# Patient Record
Sex: FEMALE | Race: WHITE | Employment: UNEMPLOYED | URBAN - METROPOLITAN AREA
[De-identification: names, ages, dates, MRNs, and addresses within clinical notes are randomized per-mention and may not be internally consistent; named-entity substitution may affect disease eponyms.]

---

## 2023-07-29 ENCOUNTER — HOSPITAL ENCOUNTER (OUTPATIENT)
Age: 68
Discharge: HOME | End: 2023-07-29
Attending: EMERGENCY MEDICINE
Payer: MEDICARE

## 2023-07-29 ENCOUNTER — APPOINTMENT (OUTPATIENT)
Dept: GENERAL RADIOLOGY | Age: 68
End: 2023-07-29
Attending: EMERGENCY MEDICINE
Payer: MEDICARE

## 2023-07-29 VITALS
SYSTOLIC BLOOD PRESSURE: 94 MMHG | HEART RATE: 58 BPM | OXYGEN SATURATION: 99 % | RESPIRATION RATE: 20 BRPM | TEMPERATURE: 97 F | DIASTOLIC BLOOD PRESSURE: 55 MMHG

## 2023-07-29 DIAGNOSIS — M19.039 WRIST ARTHRITIS: Primary | ICD-10-CM

## 2023-07-29 PROCEDURE — 73110 X-RAY EXAM OF WRIST: CPT | Performed by: EMERGENCY MEDICINE

## 2023-07-29 PROCEDURE — 99203 OFFICE O/P NEW LOW 30 MIN: CPT

## 2023-07-29 NOTE — DISCHARGE INSTRUCTIONS
Thank you for visiting our immediate care for your health care needs. Please follow up with your regular doctor in the next 1-2 days. If you have any additional problems please return to the immediate care. Please take Tylenol and or Motrin for pain. Please wear your wrist splint that you have at home.

## 2023-07-29 NOTE — ED INITIAL ASSESSMENT (HPI)
Patient states she was cleaning around her home a few days ago when she heard and felt a crack in her right wrist.  Now with pain to right wrist.